# Patient Record
Sex: FEMALE | Race: WHITE | Employment: UNEMPLOYED | URBAN - METROPOLITAN AREA
[De-identification: names, ages, dates, MRNs, and addresses within clinical notes are randomized per-mention and may not be internally consistent; named-entity substitution may affect disease eponyms.]

---

## 2023-03-13 ENCOUNTER — HOSPITAL ENCOUNTER (EMERGENCY)
Age: 23
Discharge: HOME | End: 2023-03-13
Attending: GENERAL PRACTICE
Payer: COMMERCIAL

## 2023-03-13 VITALS
DIASTOLIC BLOOD PRESSURE: 67 MMHG | HEART RATE: 95 BPM | BODY MASS INDEX: 31.89 KG/M2 | WEIGHT: 180 LBS | HEIGHT: 63 IN | OXYGEN SATURATION: 98 % | SYSTOLIC BLOOD PRESSURE: 108 MMHG | TEMPERATURE: 98.2 F | RESPIRATION RATE: 16 BRPM

## 2023-03-13 DIAGNOSIS — L30.9 DERMATITIS: Primary | ICD-10-CM

## 2023-03-13 PROCEDURE — 6370000000 HC RX 637 (ALT 250 FOR IP): Performed by: GENERAL PRACTICE

## 2023-03-13 PROCEDURE — 99283 EMERGENCY DEPT VISIT LOW MDM: CPT | Performed by: GENERAL PRACTICE

## 2023-03-13 RX ORDER — HYDROCORTISONE 25 MG/ML
LOTION TOPICAL
Qty: 60 ML | Refills: 2 | Status: SHIPPED | OUTPATIENT
Start: 2023-03-13

## 2023-03-13 RX ORDER — DIPHENHYDRAMINE HCL 25 MG
25 CAPSULE ORAL
Status: COMPLETED | OUTPATIENT
Start: 2023-03-13 | End: 2023-03-13

## 2023-03-13 RX ORDER — PREDNISONE 10 MG/1
40 TABLET ORAL ONCE
Status: COMPLETED | OUTPATIENT
Start: 2023-03-13 | End: 2023-03-13

## 2023-03-13 RX ADMIN — PREDNISONE 40 MG: 10 TABLET ORAL at 19:24

## 2023-03-13 RX ADMIN — DIPHENHYDRAMINE HYDROCHLORIDE 25 MG: 25 CAPSULE ORAL at 19:24

## 2023-03-13 NOTE — ED PROVIDER NOTES
Emergency Department Provider Note                   PCP:                No primary care provider on file. Age: 23 y.o. Sex: female     DISPOSITION       No diagnosis found. MEDICAL DECISION MAKING  Complexity of Problems Addressed:  1 acute uncomplicated illness    Data Reviewed and Analyzed:  Category 1:     I ordered each unique test.  I reviewed the results of each unique test.        Category 2:       Category 3: Discussion of management or test interpretation. Patient seems to have patches of inflammatory dermatitis likely due to exposure from poison oak and she does have some areas of hives in that area. Patient is improved here with Benadryl and a dose of steroid. She will be treated with topical steroids and Benadryl as needed. Patient has nothing to suggest anaphylaxis or angioedema. Strict return precautions are given. Risk of Complications and/or Morbidity of Patient Management:  Prescription drug management performed     Nedra Brooks is a 23 y.o. female who presents to the Emergency Department with chief complaint of    Chief Complaint   Patient presents with    Urticaria      Patient describes a rash. Patient states she was exposed to poison oak about 3 to 4 days ago. She has had spots of hives on her left lower abdomen, left arm, left thigh, and right flank and right breast.  She denies any diffuse hives. She says she felt as though she may be developing some mild phlegm and tightness in her throat and that is what made her come in. She denies cough or shortness of breath. She denies lip or tongue swelling. Patient is not had any other exposures such as new medications, plants, animals, detergents, foods, or any other new exposures. Patient's  also is being treated with steroids for hives on his arm secondary to the poison oak exposure. Review of Systems   All other systems reviewed and are negative. Vitals signs and nursing note reviewed. Patient Vitals for the past 4 hrs:   Temp Pulse Resp BP SpO2   03/13/23 1840 98.2 °F (36.8 °C) (!) 102 18 122/81 97 %          Physical Exam  Constitutional:       General: She is not in acute distress. HENT:      Head: Normocephalic and atraumatic. Nose: Nose normal.      Mouth/Throat:      Mouth: Mucous membranes are moist.      Comments: No lip or tongue swelling  Eyes:      Extraocular Movements: Extraocular movements intact. Pupils: Pupils are equal, round, and reactive to light. Cardiovascular:      Rate and Rhythm: Normal rate and regular rhythm. Heart sounds: Normal heart sounds. Pulmonary:      Effort: No respiratory distress. Breath sounds: No wheezing. Abdominal:      General: Abdomen is flat. Palpations: Abdomen is soft. Tenderness: There is no abdominal tenderness. Musculoskeletal:         General: No swelling or tenderness. Cervical back: Normal range of motion. Skin:     Findings: No erythema or rash. Comments: Patchy areas of raised urticaria on the left lower abdomen, left inner thigh, and right flank. These are the areas that she exposed to me. Neurological:      General: No focal deficit present. Mental Status: She is oriented to person, place, and time. Psychiatric:         Mood and Affect: Mood normal.         Behavior: Behavior normal.        Procedures     No orders of the defined types were placed in this encounter. Medications   predniSONE (DELTASONE) tablet 40 mg (has no administration in time range)   diphenhydrAMINE (BENADRYL) capsule 25 mg (has no administration in time range)       New Prescriptions    No medications on file        No past medical history on file. No past surgical history on file. No family history on file. Allergies: Oxycodone and Tramadol    Previous Medications    No medications on file        No results found for any visits on 03/13/23.      No orders to display Voice dictation software was used during the making of this note. This software is not perfect and grammatical and other typographical errors may be present. This note has not been completely proofread for errors.      Billie Israel DO  03/13/23 2027

## 2023-03-13 NOTE — ED NOTES
Allergies   Allergen Reactions    Oxycodone Hives    Tramadol Hives     Pt and family notified of intended use and side effects of medications given. Opportunity given to ask questions.         Amado Wiseman RN  03/13/23 1925

## 2023-03-13 NOTE — ED TRIAGE NOTES
Pt came into contact with poison oak three days. Pt has hives on elbow that has begun to spread and cause more itching. Pt reports burning in throat and chest tightness. Pt has been using calamine lotion at home for hives. Pt is 23 weeks pregnant.

## 2023-10-15 ENCOUNTER — APPOINTMENT (OUTPATIENT)
Dept: GENERAL RADIOLOGY | Age: 23
End: 2023-10-15
Payer: MEDICAID

## 2023-10-15 ENCOUNTER — HOSPITAL ENCOUNTER (EMERGENCY)
Age: 23
Discharge: HOME | End: 2023-10-15
Attending: EMERGENCY MEDICINE
Payer: MEDICAID

## 2023-10-15 VITALS
OXYGEN SATURATION: 98 % | WEIGHT: 170 LBS | HEART RATE: 86 BPM | DIASTOLIC BLOOD PRESSURE: 68 MMHG | SYSTOLIC BLOOD PRESSURE: 118 MMHG | HEIGHT: 63 IN | BODY MASS INDEX: 30.12 KG/M2 | TEMPERATURE: 98.7 F | RESPIRATION RATE: 18 BRPM

## 2023-10-15 DIAGNOSIS — S69.92XA INJURY OF LEFT HAND, INITIAL ENCOUNTER: Primary | ICD-10-CM

## 2023-10-15 PROCEDURE — 99284 EMERGENCY DEPT VISIT MOD MDM: CPT

## 2023-10-15 PROCEDURE — 6360000002 HC RX W HCPCS: Performed by: EMERGENCY MEDICINE

## 2023-10-15 PROCEDURE — 73130 X-RAY EXAM OF HAND: CPT

## 2023-10-15 PROCEDURE — 96372 THER/PROPH/DIAG INJ SC/IM: CPT

## 2023-10-15 PROCEDURE — 73090 X-RAY EXAM OF FOREARM: CPT

## 2023-10-15 PROCEDURE — 73080 X-RAY EXAM OF ELBOW: CPT

## 2023-10-15 RX ORDER — KETOROLAC TROMETHAMINE 30 MG/ML
30 INJECTION, SOLUTION INTRAMUSCULAR; INTRAVENOUS ONCE
Status: COMPLETED | OUTPATIENT
Start: 2023-10-15 | End: 2023-10-15

## 2023-10-15 RX ORDER — DICLOFENAC POTASSIUM 50 MG/1
50 TABLET, FILM COATED ORAL 2 TIMES DAILY
Qty: 10 TABLET | Refills: 0 | Status: SHIPPED | OUTPATIENT
Start: 2023-10-15 | End: 2023-10-20

## 2023-10-15 RX ADMIN — KETOROLAC TROMETHAMINE 30 MG: 30 INJECTION, SOLUTION INTRAMUSCULAR at 00:39

## 2023-10-15 ASSESSMENT — PAIN DESCRIPTION - LOCATION: LOCATION: HAND

## 2023-10-15 ASSESSMENT — PAIN SCALES - GENERAL
PAINLEVEL_OUTOF10: 8
PAINLEVEL_OUTOF10: 8

## 2023-10-15 ASSESSMENT — PAIN DESCRIPTION - ORIENTATION: ORIENTATION: LEFT

## 2023-10-15 ASSESSMENT — PAIN - FUNCTIONAL ASSESSMENT: PAIN_FUNCTIONAL_ASSESSMENT: 0-10

## 2023-10-15 NOTE — ED TRIAGE NOTES
Pt ambulatory with steady gait to room 10,  and baby by side. PT reports earlier today she swatted a bug off of a table, but when she did so she hit her left hand against a wooden table. Pt c/o pain from her left hand up to her left elbow. Pt has not taken anything for pain pta.

## 2023-10-15 NOTE — ED PROVIDER NOTES
Emergency Department Provider Note                   PCP:                BARNEY Sims NP               Age: 23 y.o. Sex: female       ICD-10-CM    1. Injury of left hand, initial encounter  S69. 92XA           DISPOSITION Decision To Discharge 10/15/2023 01:26:26 AM        MDM  Number of Diagnoses or Management Options  Injury of left hand, initial encounter  Diagnosis management comments: MEDICAL DECISION MAKING  Complexity of Problems Addressed:  1 or more acute illnesses that pose a threat to life or bodily function. Data Reviewed and Analyzed:  Category 2:   I interpreted the X-rays No acute fractures. Category 3: Discussion of management or test interpretation. The patient hit her left hand on a table and has swelling noted in that area. However, she also has pain at the arm. Imaging ordered for areas of pain. No acute fractures noted. Suspect radiating pain is due to swelling. Patient is placed in arm sling. She is given IM ketorolac and will be placed on anti-inflammatories. Recommend close follow-up. Risk of Complications and/or Morbidity of Patient Management:  Prescription drug management performed. Orders Placed This Encounter   Procedures    XR HAND LEFT (MIN 3 VIEWS)    XR RADIUS ULNA LEFT (2 VIEWS)    XR ELBOW LEFT (MIN 3 VIEWS)    [facility] Arm Sling, Left; M        Medications   ketorolac (TORADOL) injection 30 mg (30 mg IntraMUSCular Given 10/15/23 0039)       New Prescriptions    DICLOFENAC (CATAFLAM) 50 MG TABLET    Take 1 tablet by mouth 2 times daily for 5 days        Sarai Aguilar is a 23 y.o. female who presents to the Emergency Department with chief complaint of    Chief Complaint   Patient presents with    Hand Injury      The patient presents with left hand, left forearm, and left elbow pain. She states that she was swatting at a spider in the afternoon and actually hit a table.   She developed swelling in the ulnar side

## 2023-11-13 ENCOUNTER — APPOINTMENT (OUTPATIENT)
Dept: CT IMAGING | Age: 23
End: 2023-11-13
Payer: COMMERCIAL

## 2023-11-13 ENCOUNTER — HOSPITAL ENCOUNTER (EMERGENCY)
Age: 23
Discharge: HOME | End: 2023-11-13
Payer: COMMERCIAL

## 2023-11-13 VITALS
SYSTOLIC BLOOD PRESSURE: 123 MMHG | HEIGHT: 63 IN | DIASTOLIC BLOOD PRESSURE: 85 MMHG | HEART RATE: 85 BPM | BODY MASS INDEX: 30.48 KG/M2 | OXYGEN SATURATION: 100 % | TEMPERATURE: 98.6 F | WEIGHT: 172 LBS | RESPIRATION RATE: 18 BRPM

## 2023-11-13 DIAGNOSIS — J20.9 ACUTE BRONCHITIS, UNSPECIFIED ORGANISM: ICD-10-CM

## 2023-11-13 DIAGNOSIS — J02.9 ACUTE PHARYNGITIS, UNSPECIFIED ETIOLOGY: ICD-10-CM

## 2023-11-13 DIAGNOSIS — J02.9 SORE THROAT: Primary | ICD-10-CM

## 2023-11-13 LAB
ALBUMIN SERPL-MCNC: 4 G/DL (ref 3.5–5)
ALBUMIN/GLOB SERPL: 1.1 (ref 0.4–1.6)
ALP SERPL-CCNC: 107 U/L (ref 45–117)
ALT SERPL-CCNC: 21 U/L (ref 13–61)
ANION GAP SERPL CALC-SCNC: 12 MMOL/L (ref 2–11)
AST SERPL-CCNC: 40 U/L (ref 15–37)
BASOPHILS # BLD: 0 K/UL (ref 0–0.2)
BASOPHILS NFR BLD: 0 % (ref 0–2)
BILIRUB SERPL-MCNC: 0.3 MG/DL (ref 0.2–1.1)
BUN SERPL-MCNC: 9 MG/DL (ref 6–23)
CALCIUM SERPL-MCNC: 9 MG/DL (ref 8.3–10.4)
CHLORIDE SERPL-SCNC: 101 MMOL/L (ref 98–107)
CO2 SERPL-SCNC: 23 MMOL/L (ref 21–32)
CREAT SERPL-MCNC: 0.63 MG/DL (ref 0.6–1)
DIFFERENTIAL METHOD BLD: NORMAL
EOSINOPHIL # BLD: 0.1 K/UL (ref 0–0.8)
EOSINOPHIL NFR BLD: 1 % (ref 0.5–7.8)
ERYTHROCYTE [DISTWIDTH] IN BLOOD BY AUTOMATED COUNT: 12.3 % (ref 11.9–14.6)
FLUAV RNA SPEC QL NAA+PROBE: NOT DETECTED
FLUBV RNA SPEC QL NAA+PROBE: NOT DETECTED
GLOBULIN SER CALC-MCNC: 3.6 G/DL (ref 2.8–4.5)
GLUCOSE SERPL-MCNC: 88 MG/DL (ref 65–100)
HCG UR QL: NEGATIVE
HCT VFR BLD AUTO: 40 % (ref 35.8–46.3)
HGB BLD-MCNC: 13.1 G/DL (ref 11.7–15.4)
IMM GRANULOCYTES # BLD AUTO: 0 K/UL (ref 0–0.5)
IMM GRANULOCYTES NFR BLD AUTO: 0 % (ref 0–5)
LYMPHOCYTES # BLD: 1.2 K/UL (ref 0.5–4.6)
LYMPHOCYTES NFR BLD: 16 % (ref 13–44)
MCH RBC QN AUTO: 26.8 PG (ref 26.1–32.9)
MCHC RBC AUTO-ENTMCNC: 32.8 G/DL (ref 31.4–35)
MCV RBC AUTO: 82 FL (ref 82–102)
MONOCYTES # BLD: 0.5 K/UL (ref 0.1–1.3)
MONOCYTES NFR BLD: 7 % (ref 4–12)
NEUTS SEG # BLD: 5.8 K/UL (ref 1.7–8.2)
NEUTS SEG NFR BLD: 75 % (ref 43–78)
NRBC # BLD: 0 K/UL (ref 0–0.2)
PLATELET # BLD AUTO: 206 K/UL (ref 150–450)
PMV BLD AUTO: 9.7 FL (ref 9.4–12.3)
POTASSIUM SERPL-SCNC: 4 MMOL/L (ref 3.5–5.1)
POTASSIUM SERPL-SCNC: 5.5 MMOL/L (ref 3.5–5.1)
PROT SERPL-MCNC: 7.6 G/DL (ref 6.4–8.2)
RBC # BLD AUTO: 4.88 M/UL (ref 4.05–5.2)
SARS-COV-2 RDRP RESP QL NAA+PROBE: NOT DETECTED
SODIUM SERPL-SCNC: 136 MMOL/L (ref 133–143)
SOURCE: NORMAL
STREP, MOLECULAR: NOT DETECTED
WBC # BLD AUTO: 7.7 K/UL (ref 4.3–11.1)

## 2023-11-13 PROCEDURE — 99285 EMERGENCY DEPT VISIT HI MDM: CPT

## 2023-11-13 PROCEDURE — 87502 INFLUENZA DNA AMP PROBE: CPT

## 2023-11-13 PROCEDURE — 6360000004 HC RX CONTRAST MEDICATION

## 2023-11-13 PROCEDURE — 87651 STREP A DNA AMP PROBE: CPT

## 2023-11-13 PROCEDURE — 87635 SARS-COV-2 COVID-19 AMP PRB: CPT

## 2023-11-13 PROCEDURE — 96374 THER/PROPH/DIAG INJ IV PUSH: CPT

## 2023-11-13 PROCEDURE — 85025 COMPLETE CBC W/AUTO DIFF WBC: CPT

## 2023-11-13 PROCEDURE — 81025 URINE PREGNANCY TEST: CPT

## 2023-11-13 PROCEDURE — 70491 CT SOFT TISSUE NECK W/DYE: CPT

## 2023-11-13 PROCEDURE — 6360000002 HC RX W HCPCS

## 2023-11-13 PROCEDURE — 6370000000 HC RX 637 (ALT 250 FOR IP): Performed by: PHYSICIAN ASSISTANT

## 2023-11-13 PROCEDURE — 80053 COMPREHEN METABOLIC PANEL: CPT

## 2023-11-13 PROCEDURE — 84132 ASSAY OF SERUM POTASSIUM: CPT

## 2023-11-13 PROCEDURE — 2580000003 HC RX 258: Performed by: PHYSICIAN ASSISTANT

## 2023-11-13 RX ORDER — PREDNISONE 50 MG/1
50 TABLET ORAL DAILY
Qty: 5 TABLET | Refills: 0 | Status: SHIPPED | OUTPATIENT
Start: 2023-11-13 | End: 2023-11-18

## 2023-11-13 RX ORDER — AMOXICILLIN AND CLAVULANATE POTASSIUM 875; 125 MG/1; MG/1
1 TABLET, FILM COATED ORAL 2 TIMES DAILY
Qty: 20 TABLET | Refills: 0 | Status: SHIPPED | OUTPATIENT
Start: 2023-11-13 | End: 2023-11-23

## 2023-11-13 RX ORDER — 0.9 % SODIUM CHLORIDE 0.9 %
1000 INTRAVENOUS SOLUTION INTRAVENOUS ONCE
Status: COMPLETED | OUTPATIENT
Start: 2023-11-13 | End: 2023-11-13

## 2023-11-13 RX ORDER — DEXAMETHASONE SODIUM PHOSPHATE 10 MG/ML
10 INJECTION INTRAMUSCULAR; INTRAVENOUS ONCE
Status: COMPLETED | OUTPATIENT
Start: 2023-11-13 | End: 2023-11-13

## 2023-11-13 RX ORDER — AMOXICILLIN AND CLAVULANATE POTASSIUM 875; 125 MG/1; MG/1
1 TABLET, FILM COATED ORAL
Status: COMPLETED | OUTPATIENT
Start: 2023-11-13 | End: 2023-11-13

## 2023-11-13 RX ADMIN — AMOXICILLIN AND CLAVULANATE POTASSIUM 1 TABLET: 875; 125 TABLET, FILM COATED ORAL at 18:47

## 2023-11-13 RX ADMIN — DEXAMETHASONE SODIUM PHOSPHATE 10 MG: 10 INJECTION INTRAMUSCULAR; INTRAVENOUS at 18:11

## 2023-11-13 RX ADMIN — IOPAMIDOL 80 ML: 755 INJECTION, SOLUTION INTRAVENOUS at 17:20

## 2023-11-13 RX ADMIN — SODIUM CHLORIDE 1000 ML: 9 INJECTION, SOLUTION INTRAVENOUS at 18:11

## 2023-11-13 ASSESSMENT — LIFESTYLE VARIABLES
HOW OFTEN DO YOU HAVE A DRINK CONTAINING ALCOHOL: NEVER
HOW MANY STANDARD DRINKS CONTAINING ALCOHOL DO YOU HAVE ON A TYPICAL DAY: PATIENT DOES NOT DRINK

## 2023-11-13 ASSESSMENT — PAIN - FUNCTIONAL ASSESSMENT
PAIN_FUNCTIONAL_ASSESSMENT: 0-10
PAIN_FUNCTIONAL_ASSESSMENT: NONE - DENIES PAIN

## 2023-11-13 ASSESSMENT — PAIN DESCRIPTION - LOCATION: LOCATION: THROAT

## 2023-11-13 ASSESSMENT — PAIN SCALES - GENERAL: PAINLEVEL_OUTOF10: 3

## 2023-11-13 NOTE — ED PROVIDER NOTES
I was asked to follow-up on patient's CT results. See LI Leal's note for details. Patient presents with sore throat and cough. She appears to have uvular inflammation with deviation on exam but has had prior tonsillectomy. No evidence of abscess on exam.  Labs unrevealing other than hyperkalemia. Repeated potassium was normal.  CT shows evidence of pharyngitis but no abscess. Suspect uvulitis as source. CT also shows upper lung groundglass opacities. Will cover for infectious source of both with Augmentin and also will place on prednisone. She is to follow-up closely with her primary doctor and return if worsening in any way.      [place], [place]  11/13/23 [id number]

## 2023-11-13 NOTE — ED TRIAGE NOTES
Pt ambulatory to triage. Pt reports sore throat and cough that started Saturday. Pt denies fevers. Pt states she has been using chloraseptic spray.

## 2023-11-13 NOTE — ED PROVIDER NOTES
Emergency Department Provider Note                   PCP:                BARNEY Wiley NP               Age: 20 y.o. Sex: female     [address]    1. Sore throat  J02.9           MEDICAL DECISION MAKING  Complexity of Problems Addressed:  Complexity of Problem: 1 acute complicated illness or injury. Data Reviewed and Analyzed:  Category 1:   I reviewed external records: ED visit note from an outside group. I reviewed external records: provider visit note from PCP. I reviewed external records: provider visit note from outside specialist.  I ordered each unique test.  I reviewed the results of each unique test.        Category 3: Discussion of management or test interpretation. 20year old female presents complaining of sore throat and cough x 2 days. On presentation, patient is afebrile, vital signs are stable, and she is well-appearing in no acute distress. She is speaking in full sentences without muffled voice, drooling, or evidence of respiratory distress. No evidence of trismus, cervical lymphadenopathy, submandibular, or sublingual swelling. No nuchal rigidity or meningeal signs. There is evidence of some uvular deviation to the left. No evidence of tonsillar edema as she is status post tonsillectomy/adenectomy, and no real visualized fullness on that side.,  However patient is endorsing fullness and difficulty swallowing liquids. Will obtain lab work, CT scan, and swabs for further evaluation. Rapid flu, strep, and COVID negative. CBC with stable H&H and no evidence of leukocytosis. CMP with mildly elevated potassium of 5.5 but stable kidney function. Urine hCG is negative. Initiating Decadron as well.    6:03 PM EST The patient's care will be transitioned to [facility]. At this time, the plan is await CT scan to eval for possible peritonsillar abscess and dispo accordingly. Please see that provider's documentation for further information.